# Patient Record
Sex: FEMALE | Race: WHITE | Employment: OTHER | RURAL
[De-identification: names, ages, dates, MRNs, and addresses within clinical notes are randomized per-mention and may not be internally consistent; named-entity substitution may affect disease eponyms.]

---

## 2017-10-27 ENCOUNTER — OFFICE VISIT (OUTPATIENT)
Dept: FAMILY MEDICINE CLINIC | Age: 62
End: 2017-10-27

## 2017-10-27 VITALS
DIASTOLIC BLOOD PRESSURE: 86 MMHG | SYSTOLIC BLOOD PRESSURE: 164 MMHG | OXYGEN SATURATION: 99 % | BODY MASS INDEX: 24.97 KG/M2 | RESPIRATION RATE: 18 BRPM | WEIGHT: 137.6 LBS | HEART RATE: 62 BPM | TEMPERATURE: 98.4 F

## 2017-10-27 DIAGNOSIS — R21 SKIN RASH: Primary | ICD-10-CM

## 2017-10-27 DIAGNOSIS — S30.861A TICK BITE OF ABDOMEN, INITIAL ENCOUNTER: ICD-10-CM
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
## 2017-10-27 RX ORDER — DOXYCYCLINE 100 MG/1
100 TABLET ORAL 2 TIMES DAILY
Qty: 28 TAB | Refills: 0 | Status: SHIPPED | OUTPATIENT
Start: 2017-10-27 | End: 2017-11-10

## 2017-10-27 NOTE — PROGRESS NOTES
Tayler Carson is a 60 y.o. female who presents to the office today with the following:  Chief Complaint   Patient presents with    Rash     red circular rash abdominal area has gotten bigger since Monday, itchy, tender       HPI  Noticed rash on abdomen on Monday ~5 d ago. A little itchy but not bad. \"Not painful but tender\"   Is starting to spread. Feels a little warm. Large circular red rash. Progressively increasing in size. Has been bit by ticks before, but does not recall being bitten by anything recently. Has been working in her garden. Does have hx contact dermatitis to plants, but is careful to keep covered while gardening and never had her abdomen exposed. Has no other areas w/rash. Was worried about Lyme dz due to appearance of rash and would like to be tested. Otherwise feeling well with no other complaints or acute concerns. Review of Systems   Constitutional: Negative for chills, fever and malaise/fatigue. HENT: Negative. Eyes: Negative. Respiratory: Negative for cough and shortness of breath. No difficulty breathing/swallowing. No facial/throat swelling. Cardiovascular: Negative for chest pain. Gastrointestinal: Negative. Genitourinary: Negative. Musculoskeletal: Negative for myalgias. Skin: Positive for itching and rash. Neurological: Negative for dizziness, tingling, sensory change and headaches. See HPI.     Past Medical History:   Diagnosis Date    DDD (degenerative disc disease)     Fibroids     Osteoarthritis     right shoulder       Past Surgical History:   Procedure Laterality Date    ENDOSCOPY, COLON, DIAGNOSTIC  11/2008    normal    HX BUNIONECTOMY      HX LUMBAR DISKECTOMY      HX TONSIL AND ADENOIDECTOMY      KNEE SCOPE, AUTOGRAFT IMPANT Right 2014       Allergies   Allergen Reactions    Erythromycin Diarrhea and Nausea and Vomiting       Current Outpatient Prescriptions   Medication Sig    doxycycline (ADOXA) 100 mg tablet Take 1 Tab by mouth two (2) times a day for 14 days.  naproxen sodium (ALEVE) 220 mg cap Take  by mouth.  psyllium husk-calcium (METAMUCIL PLUS CALCIUM) 1-60 gram-mg cap Take  by mouth.  clotrimazole-betamethasone (LOTRISONE) topical cream 45 gm apply bid     No current facility-administered medications for this visit. Social History     Social History    Marital status:      Spouse name: N/A    Number of children: N/A    Years of education: N/A     Social History Main Topics    Smoking status: Former Smoker    Smokeless tobacco: Never Used    Alcohol use Yes      Comment: socially, sometimes 3 drinks/day    Drug use: No    Sexual activity: Yes     Partners: Male     Birth control/ protection: None     Other Topics Concern    None     Social History Narrative       Family History   Problem Relation Age of Onset    Cancer Mother     Cancer Maternal Aunt     Cancer Maternal Uncle          Physical Exam:  Visit Vitals    /86 (BP 1 Location: Left arm, BP Patient Position: Sitting)    Pulse 62    Temp 98.4 °F (36.9 °C) (Oral)    Resp 18    Wt 137 lb 9.6 oz (62.4 kg)    SpO2 99%    BMI 24.97 kg/m2     Physical Exam   Constitutional: She is oriented to person, place, and time and well-developed, well-nourished, and in no distress. HENT:   Head: Normocephalic and atraumatic. Right Ear: External ear normal.   Left Ear: External ear normal.   Nose: Nose normal.   Mouth/Throat: Oropharynx is clear and moist.   Eyes: Conjunctivae are normal.   Neck: Normal range of motion. Neck supple. Cardiovascular: Normal rate and regular rhythm. Pulmonary/Chest: Effort normal and breath sounds normal.   Abdominal: Soft. There is no tenderness (except to skin over rash). Lymphadenopathy:     She has no cervical adenopathy. Neurological: She is alert and oriented to person, place, and time. Gait normal.   Skin: Skin is warm and dry. Rash noted.         Large min raised circular rash to mid abdomen, light erythema, warm and a little sore to the touch. No wound or opening. Surrounding skin and remaining exam unremarkable. Psychiatric: Mood and affect normal.   Nursing note and vitals reviewed. Assessment/Plan:    ICD-10-CM ICD-9-CM    1. Skin rash R21 782.1 doxycycline (ADOXA) 100 mg tablet      CBC WITH AUTOMATED DIFF      EHRLICHIOSIS (HME AND HGE) AB PANEL      LYME AB/WESTERN BLOT REFLEX      R RICKETTSII AB IGG W/REFL   2. Tick bite of abdomen, initial encounter S30.861A 911.4 CBC WITH AUTOMATED DIFF    W[address]. [place] E592.6 EHRLICHIOSIS (HME AND HGE) AB PANEL      LYME AB/WESTERN BLOT REFLEX      R RICKETTSII AB IGG W/REFL     Discussed potential etiologies. With hx of tick exposure and suspicious appearing rash will go ahead and cover for tick and inf. Counseled pt on potential medication AEs/interactions. Avoid UV exp, eat with full meals. .etc.  Explained limitations of testing, but pt would like to proceed. Will have drawn no earlier than 3 weeks. In meantime, rec she may take antihistamine for potential allergy/contact component as well. Avoid itching. Keep area clean/dry. RTC/seek medical attn for any new or worsening sxs in meantime. Follow-up Disposition:  Return if symptoms worsen or fail to improve.     Luis Daniel Johnson PA-C

## 2017-11-07 ENCOUNTER — OFFICE VISIT (OUTPATIENT)
Dept: FAMILY MEDICINE CLINIC | Age: 62
End: 2017-11-07

## 2017-11-07 VITALS
SYSTOLIC BLOOD PRESSURE: 181 MMHG | WEIGHT: 139 LBS | TEMPERATURE: 98.9 F | HEART RATE: 64 BPM | OXYGEN SATURATION: 99 % | BODY MASS INDEX: 25.58 KG/M2 | HEIGHT: 62 IN | RESPIRATION RATE: 16 BRPM | DIASTOLIC BLOOD PRESSURE: 70 MMHG

## 2017-11-07 DIAGNOSIS — S20.212A CONTUSION OF RIB ON LEFT SIDE, INITIAL ENCOUNTER: ICD-10-CM

## 2017-11-07 DIAGNOSIS — R03.0 ELEVATED BP WITHOUT DIAGNOSIS OF HYPERTENSION: ICD-10-CM

## 2017-11-07 DIAGNOSIS — R07.81 RIB PAIN ON LEFT SIDE: Primary | ICD-10-CM

## 2017-11-07 RX ORDER — NAPROXEN 375 MG/1
375 TABLET ORAL
Qty: 30 TAB | Refills: 0 | Status: SHIPPED | OUTPATIENT
Start: 2017-11-07

## 2017-11-07 NOTE — MR AVS SNAPSHOT
Visit Information Date & Time Provider Department Dept. Phone Encounter #  
 11/7/2017 11:30 AM Leigha Holloway PA-C [address] [id number] Follow-up Instructions Return if symptoms worsen or fail to improve. Upcoming Health Maintenance Date Due Hepatitis C Screening 1955 PAP AKA CERVICAL CYTOLOGY 10/18/1975 BREAST CANCER SCRN MAMMOGRAM 10/18/2004 FOBT Q 1 YEAR AGE 50-75 10/18/2004 DTaP/Tdap/Td series (1 - Tdap) 6/5/2012 Influenza Age 5 to Adult 8/1/2017 Allergies as of 11/7/2017  Review Complete On: 11/7/2017 By: Leigha Holloway PA-C Severity Noted Reaction Type Reactions Erythromycin  10/27/2017    Diarrhea, Nausea and Vomiting Current Immunizations  Reviewed on 1/7/2014 Name Date Influenza Vaccine 10/15/2015, 11/13/2013, 1/11/2013 Td 6/4/2012 Zoster Vaccine, Live 10/20/2015 Not reviewed this visit You Were Diagnosed With   
  
 Codes Comments Rib pain on left side    -  Primary ICD-10-CM: R07.81 ICD-9-CM: 786.50 Contusion of rib on left side, initial encounter     ICD-10-CM: S20.212A ICD-9-CM: 922.1 Elevated BP without diagnosis of hypertension     ICD-10-CM: R03.0 ICD-9-CM: 796.2 Vitals BP Pulse Temp Resp Height(growth percentile) Weight(growth percentile) 162/69 (BP 1 Location: Right arm, BP Patient Position: Sitting) 64 98.9 °F (37.2 °C) (Oral) 16 5' 2.25\" (1.581 m) 139 lb (63 kg) SpO2 BMI OB Status Smoking Status 99% 25.22 kg/m2 Postmenopausal Former Smoker BMI and BSA Data Body Mass Index Body Surface Area  
 25.22 kg/m 2 1.66 m 2 Preferred Pharmacy Pharmacy Name Phone [address], [address] [facility]  [address] [phone] Your Updated Medication List  
  
   
This list is accurate as of: 11/7/17 12:17 PM.  Always use your most recent med list.  
 clotrimazole-betamethasone topical cream  
Commonly known as:  LOTRISONE  
45 gm apply bid  
  
 doxycycline 100 mg tablet Commonly known as:  ADOXA Take 1 Tab by mouth two (2) times a day for 14 days. METAMUCIL PLUS CALCIUM 1-60 gram-mg Cap Generic drug:  psyllium husk-calcium Take  by mouth. naproxen 375 mg tablet Commonly known as:  NAPROSYN Take 1 Tab by mouth three (3) times daily (with meals). Prescriptions Sent to Pharmacy Refills  
 naproxen (NAPROSYN) 375 mg tablet 0 Sig: Take 1 Tab by mouth three (3) times daily (with meals). Class: Normal  
 [facility] [place] [address], [facility] [address] Ph #: [phone] Route: Oral  
  
Follow-up Instructions Return if symptoms worsen or fail to improve. Patient Instructions Chest Contusion: Care Instructions Your Care Instructions A chest contusion, or bruise, is caused by a fall or direct blow to the chest. Car crashes, falls, getting punched, and injury from bicycle handlebars are common causes of chest contusions. A very forceful blow to the chest can injure the heart or blood vessels in the chest, the lungs, the airway, the liver, or the spleen. Pain may be caused by an injury to muscles, cartilage, or ribs. Deep breathing, coughing, or sneezing can increase your pain. Lying on the injured area also can cause pain. Follow-up care is a key part of your treatment and safety. Be sure to make and go to all appointments, and call your doctor if you are having problems. It's also a good idea to know your test results and keep a list of the medicines you take. How can you care for yourself at home? · Rest and protect the injured or sore area. Stop, change, or take a break from any activity that may be causing your pain. · Put ice or a cold pack on the area for 10 to 20 minutes at a time.  Put a thin cloth between the ice and your skin. · After 2 or 3 days, if your swelling is gone, apply a heating pad set on low or a warm cloth to your chest. Some doctors suggest that you go back and forth between hot and cold. Put a thin cloth between the heating pad and your skin. · Do not wrap or tape your ribs for support. This may cause you to take smaller breaths, which could increase your risk of pneumonia and lung collapse. · Ask your doctor if you can take an over-the-counter pain medicine, such as acetaminophen (Tylenol), ibuprofen (Advil, Motrin), or naproxen (Aleve). Be safe with medicines. Read and follow all instructions on the label. · Take your medicines exactly as prescribed. Call your doctor if you think you are having a problem with your medicine. · Gentle stretching and massage may help you feel better after a few days of rest. Stretch slowly to the point just before discomfort begins, then hold the stretch for at least 15 to 30 seconds. Do this 3 or 4 times per day. · As your pain gets better, slowly return to your normal activities. Be patient, because chest bruises can take weeks or months to heal. Any increased pain may be a sign that you need to rest a while longer. When should you call for help? Call 911 anytime you think you may need emergency care. For example, call if: 
? · You have severe trouble breathing. ? · You cough up blood. ?Call your doctor now or seek immediate medical care if: 
? · You have belly pain. ? · You are dizzy or lightheaded, or you feel like you may faint. ? · You develop new symptoms with the chest pain. ? · Your chest pain gets worse. ? · You have a fever. ? · You have some shortness of breath. ? · You have a cough that brings up mucus from the lungs. ? Watch closely for changes in your health, and be sure to contact your doctor if: 
? · Your chest pain is not improving after 1 week. Where can you learn more? Go to http://anu-radha.info/. Enter I174 in the search box to learn more about \"Chest Contusion: Care Instructions. \" Current as of: March 20, 2017 Content Version: 11.4 © 2374-2877 [employer]. Care instructions adapted under license by [employer] (which disclaims liability or warranty for this information). If you have questions about a medical condition or this instruction, always ask your healthcare professional. [address] any warranty or liability for your use of this information. Introducing [facility]! Dear Prince Kebede: Thank you for requesting a HobbyTalk account. Our records indicate that you already have an active HobbyTalk account. You can access your account anytime at [email or web address] Did you know that you can access your hospital and ER discharge instructions at any time in HobbyTalk? You can also review all of your test results from your hospital stay or ER visit. Additional Information If you have questions, please visit the Frequently Asked Questions section of the [employer]obbyTalk website at [email or web address] Remember, HobbyTalk is NOT to be used for urgent needs. For medical emergencies, dial 911. Now available from your iPhone and Android! Please provide this summary of care documentation to your next provider. Your primary care clinician is listed as Karolina Wright. If you have any questions after today's visit, please call [phone].

## 2017-11-07 NOTE — PROGRESS NOTES
Neil Sotomayor is a 60 y.o. female who presents to the office today with the following:  Chief Complaint   Patient presents with   [facility] Fall     tripped over her dog, having (L) sided pain       HPI  Presents with left rib pain and anterior chest wall pain after fell over dog last night. Pain is worse with movement, turning, and breathing deeply. Improves with rest/staying still. Also bruised left knee, but not really bothering her. No head trauma/LOC, or other injuries. Otherwise feeling well with no other complaints or acute concerns. Review of Systems   Constitutional: Negative for malaise/fatigue. Respiratory: Negative for shortness of breath. Cardiovascular: Negative for chest pain (except chest wall pain). Gastrointestinal: Negative. Genitourinary: Negative. Neurological: Negative. Negative for dizziness and headaches. See HPI. Past Medical History:   Diagnosis Date    DDD (degenerative disc disease)     Fibroids     Osteoarthritis     right shoulder       Past Surgical History:   Procedure Laterality Date    ENDOSCOPY, COLON, DIAGNOSTIC  11/2008    normal    HX BUNIONECTOMY      HX LUMBAR DISKECTOMY      HX TONSIL AND ADENOIDECTOMY      KNEE SCOPE, AUTOGRAFT IMPANT Right 2014       Allergies   Allergen Reactions    Erythromycin Diarrhea and Nausea and Vomiting       Current Outpatient Prescriptions   Medication Sig    naproxen (NAPROSYN) 375 mg tablet Take 1 Tab by mouth three (3) times daily (with meals).  doxycycline (ADOXA) 100 mg tablet Take 1 Tab by mouth two (2) times a day for 14 days.  clotrimazole-betamethasone (LOTRISONE) topical cream 45 gm apply bid    psyllium husk-calcium (METAMUCIL PLUS CALCIUM) 1-60 gram-mg cap Take  by mouth. No current facility-administered medications for this visit.         Social History     Social History    Marital status:      Spouse name: N/A    Number of children: N/A    Years of education: N/A     Social History Main Topics    Smoking status: Former Smoker    Smokeless tobacco: Never Used    Alcohol use Yes      Comment: socially, sometimes 3 drinks/day    Drug use: No    Sexual activity: Yes     Partners: Male     Birth control/ protection: None     Other Topics Concern    None     Social History Narrative       Family History   Problem Relation Age of Onset    Cancer Mother     Cancer Maternal Aunt     Cancer Maternal Uncle          Physical Exam:  Visit Vitals    /70 (BP 1 Location: Left arm)    Pulse 64    Temp 98.9 °F (37.2 °C) (Oral)    Resp 16    Ht 5' 2.25\" (1.581 m)    Wt 139 lb (63 kg)    SpO2 99%    BMI 25.22 kg/m2     Physical Exam   Constitutional: She is oriented to person, place, and time and well-developed, well-nourished, and in no distress. HENT:   Head: Normocephalic and atraumatic. Eyes: Conjunctivae are normal.   Neck: Normal range of motion. Neck supple. Cardiovascular: Normal rate and regular rhythm. Pulmonary/Chest: Effort normal and breath sounds normal. No respiratory distress. She has no wheezes. She has no rales. She exhibits tenderness. She exhibits no mass, no laceration, no edema, no deformity, no swelling and no retraction. No overlying skin abnls   Abdominal: Soft. There is no tenderness. Musculoskeletal: Normal range of motion. She exhibits no edema. Left knee: She exhibits normal range of motion, no swelling, no deformity and normal alignment. No tenderness found. Other than light bruise, left knee exam unremarkable   Neurological: She is alert and oriented to person, place, and time. Gait normal.   Skin: Skin is warm and dry. Bruising (small, light bruise above ant left knee) noted. Psychiatric: Mood and affect normal.   Nursing note and vitals reviewed. Assessment/Plan:    ICD-10-CM ICD-9-CM    1. Rib pain on left side R07.81 786.50 naproxen (NAPROSYN) 375 mg tablet   2.  Contusion of rib on left side, initial encounter S20.212A 922.1 naproxen (NAPROSYN) 375 mg tablet  Avoid other nsaids, may supplement with Tylenol if needed. Can also try some cool compresses, hold off on alt with heat another 2 d. Rec deep breathing ex. Pt would like to hold off on any imaging as she is feeling much better already, will rtc if sxs persist/worsen   3. Elevated BP without diagnosis of hypertension R03.0 796.2 Pt notes in a lot of pain after sitting up which is why her BP went up. No hx of HTN, has only been high in past under extreme stress or when in pain. Does admit to eating a lot of salt. Will work on LMs, cont to monitor BP and f/u with PCP (may plan to est care at this office, rec she return soon for f/u for BP and also fasting for routine lab work/HM due). Seek immediate care in interim for any red flag sxs. Handouts provided. Follow-up Disposition:  Return in about 2 weeks (around 11/21/2017), or if symptoms worsen or fail to improve, for f/u BP and fasting labs- pt plans to est care here.     Mak Jackson PA-C

## 2017-11-07 NOTE — PATIENT INSTRUCTIONS
Chest Contusion: Care Instructions  Your Care Instructions  A chest contusion, or bruise, is caused by a fall or direct blow to the chest. Car crashes, falls, getting punched, and injury from bicycle handlebars are common causes of chest contusions. A very forceful blow to the chest can injure the heart or blood vessels in the chest, the lungs, the airway, the liver, or the spleen. Pain may be caused by an injury to muscles, cartilage, or ribs. Deep breathing, coughing, or sneezing can increase your pain. Lying on the injured area also can cause pain. Follow-up care is a key part of your treatment and safety. Be sure to make and go to all appointments, and call your doctor if you are having problems. It's also a good idea to know your test results and keep a list of the medicines you take. How can you care for yourself at home? · Rest and protect the injured or sore area. Stop, change, or take a break from any activity that may be causing your pain. · Put ice or a cold pack on the area for 10 to 20 minutes at a time. Put a thin cloth between the ice and your skin. · After 2 or 3 days, if your swelling is gone, apply a heating pad set on low or a warm cloth to your chest. Some doctors suggest that you go back and forth between hot and cold. Put a thin cloth between the heating pad and your skin. · Do not wrap or tape your ribs for support. This may cause you to take smaller breaths, which could increase your risk of pneumonia and lung collapse. · Ask your doctor if you can take an over-the-counter pain medicine, such as acetaminophen (Tylenol), ibuprofen (Advil, Motrin), or naproxen (Aleve). Be safe with medicines. Read and follow all instructions on the label. · Take your medicines exactly as prescribed. Call your doctor if you think you are having a problem with your medicine.   · Gentle stretching and massage may help you feel better after a few days of rest. Stretch slowly to the point just before discomfort begins, then hold the stretch for at least 15 to 30 seconds. Do this 3 or 4 times per day. · As your pain gets better, slowly return to your normal activities. Be patient, because chest bruises can take weeks or months to heal. Any increased pain may be a sign that you need to rest a while longer. When should you call for help? Call 911 anytime you think you may need emergency care. For example, call if:  ? · You have severe trouble breathing. ? · You cough up blood. ?Call your doctor now or seek immediate medical care if:  ? · You have belly pain. ? · You are dizzy or lightheaded, or you feel like you may faint. ? · You develop new symptoms with the chest pain. ? · Your chest pain gets worse. ? · You have a fever. ? · You have some shortness of breath. ? · You have a cough that brings up mucus from the lungs. ? Watch closely for changes in your health, and be sure to contact your doctor if:  ? · Your chest pain is not improving after 1 week. Where can you learn more? Go to http://anu-radha.info/. Enter I174 in the search box to learn more about \"Chest Contusion: Care Instructions. \"  Current as of: March 20, 2017  Content Version: 11.4  © 2302-2367 [employer]. Care instructions adapted under license by [employer] (which disclaims liability or warranty for this information). If you have questions about a medical condition or this instruction, always ask your healthcare professional. [place] [address] any warranty or liability for your use of this information.

## 2021-02-17 ENCOUNTER — TRANSCRIBE ORDER (OUTPATIENT)
Dept: SCHEDULING | Age: 66
End: 2021-02-17

## 2021-02-17 DIAGNOSIS — Z78.0 ASYMPTOMATIC MENOPAUSAL STATE: Primary | ICD-10-CM

## 2021-06-14 ENCOUNTER — HOSPITAL ENCOUNTER (OUTPATIENT)
Dept: GENERAL RADIOLOGY | Age: 66
Discharge: HOME | End: 2021-06-14
Attending: NURSE PRACTITIONER
Payer: MEDICARE

## 2021-06-14 DIAGNOSIS — Z78.0 ASYMPTOMATIC MENOPAUSAL STATE: ICD-10-CM

## 2021-06-14 PROCEDURE — 77080 DXA BONE DENSITY AXIAL: CPT

## 2021-10-01 ENCOUNTER — TRANSCRIBE ORDER (OUTPATIENT)
Dept: SCHEDULING | Age: 66
End: 2021-10-01

## 2021-10-01 DIAGNOSIS — Z12.31 SCREENING MAMMOGRAM FOR HIGH-RISK PATIENT: Primary | ICD-10-CM

## 2021-11-01 ENCOUNTER — HOSPITAL ENCOUNTER (OUTPATIENT)
Dept: MAMMOGRAPHY | Age: 66
Discharge: HOME | End: 2021-11-01
Attending: INTERNAL MEDICINE
Payer: MEDICARE

## 2021-11-01 VITALS — WEIGHT: 145 LBS | BODY MASS INDEX: 26.31 KG/M2

## 2021-11-01 DIAGNOSIS — Z12.31 SCREENING MAMMOGRAM FOR HIGH-RISK PATIENT: ICD-10-CM

## 2021-11-01 PROCEDURE — 77063 BREAST TOMOSYNTHESIS BI: CPT

## 2023-03-24 ENCOUNTER — HOSPITAL ENCOUNTER (OUTPATIENT)
Dept: MAMMOGRAPHY | Age: 68
Discharge: HOME | End: 2023-03-24
Attending: NURSE PRACTITIONER
Payer: MEDICARE

## 2023-03-24 VITALS — WEIGHT: 145 LBS | BODY MASS INDEX: 26.31 KG/M2

## 2023-03-24 DIAGNOSIS — Z12.31 VISIT FOR SCREENING MAMMOGRAM: ICD-10-CM

## 2023-03-24 PROCEDURE — 77063 BREAST TOMOSYNTHESIS BI: CPT

## 2023-04-22 DIAGNOSIS — Z78.0 ASYMPTOMATIC MENOPAUSAL STATE: Primary | ICD-10-CM

## 2023-06-15 ENCOUNTER — HOSPITAL ENCOUNTER (OUTPATIENT)
Facility: HOSPITAL | Age: 68
Discharge: HOME | End: 2023-06-18
Payer: MEDICARE

## 2023-06-15 DIAGNOSIS — Z78.0 ASYMPTOMATIC MENOPAUSAL STATE: ICD-10-CM

## 2023-06-15 PROCEDURE — 77080 DXA BONE DENSITY AXIAL: CPT

## 2024-02-19 ENCOUNTER — TRANSCRIBE ORDERS (OUTPATIENT)
Facility: HOSPITAL | Age: 69
End: 2024-02-19

## 2024-02-19 DIAGNOSIS — Z12.31 ENCOUNTER FOR SCREENING MAMMOGRAM FOR MALIGNANT NEOPLASM OF BREAST: Primary | ICD-10-CM

## 2024-03-25 ENCOUNTER — HOSPITAL ENCOUNTER (OUTPATIENT)
Facility: HOSPITAL | Age: 69
Discharge: HOME | End: 2024-03-28
Payer: MEDICARE

## 2024-03-25 VITALS — HEIGHT: 62 IN | WEIGHT: 132 LBS | BODY MASS INDEX: 24.29 KG/M2

## 2024-03-25 DIAGNOSIS — Z12.31 ENCOUNTER FOR SCREENING MAMMOGRAM FOR MALIGNANT NEOPLASM OF BREAST: ICD-10-CM

## 2024-03-25 PROCEDURE — 77063 BREAST TOMOSYNTHESIS BI: CPT

## 2025-03-04 ENCOUNTER — TRANSCRIBE ORDERS (OUTPATIENT)
Facility: HOSPITAL | Age: 70
End: 2025-03-04

## 2025-03-04 DIAGNOSIS — Z12.31 ENCOUNTER FOR SCREENING MAMMOGRAM FOR MALIGNANT NEOPLASM OF BREAST: Primary | ICD-10-CM

## 2025-03-28 ENCOUNTER — HOSPITAL ENCOUNTER (OUTPATIENT)
Facility: HOSPITAL | Age: 70
Discharge: HOME | End: 2025-03-31
Payer: MEDICARE

## 2025-03-28 DIAGNOSIS — Z12.31 ENCOUNTER FOR SCREENING MAMMOGRAM FOR MALIGNANT NEOPLASM OF BREAST: ICD-10-CM

## 2025-03-28 PROCEDURE — 77063 BREAST TOMOSYNTHESIS BI: CPT
